# Patient Record
(demographics unavailable — no encounter records)

---

## 2025-05-23 NOTE — ASSESSMENT
[FreeTextEntry1] : Microcytic anemia Menorrhagia- last 2 weeks. heavy x 5 days, spotting for rest of the time. Regular Follows with Dr Molina (gyn) and had work up. In the past IUD was removed and reinseted in March 2024.  She tried iron pills but make her very constipated Ferritin- no recent levels Discussed at length about iron deficiency- etiology, signs and symptoms, complications, management options DIscussed about oral vs IV Iron I have reviewed and discussed the risks, benefits and side effects of IV iron including potential for anaphylaxis and skin staining due to extravasation with the patient. All questions were answered to satisfaction. Patient agrees to pursue IV iron. Schedule IV monoferric 1gm x 1 If declined by insurance, IV injectafer 750 mg x 2 or venofer 400 mg  x 3  Advised to follow up with gyn to address menorrhagia  Recommend monitoring CBC, ferritin, iron studies every 12 weeks and IV iron PRN for ferritin <  Information given in writing  Easy bruising Played sports in high school and always gets bruised Has been noticing more now Denies taking NSAIDs, aspirin, blood thinners Send Platelet counts, PT, PTT, INR, Fibrinogen, ANCA panel, vitamin C, platelet surface glycoprotein by flow cytometry ([place]), von Willebrand panel Further management accordingly  SocialHx: smokes marijuana Alcohol intake - denies 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

## 2025-05-23 NOTE — BEGINNING OF VISIT
[0] : 2) Feeling down, depressed, or hopeless: Not at all (0) [PHQ-2 Negative] : PHQ-2 Negative [Date Discussed (MM/DD/YY): ___] : Discussed: [unfilled] [Patient/Caregiver unclear of wishes] : Patient/Caregiver unclear of wishes [Abdominal Pain] : no abdominal pain [Vomiting] : no vomiting [Constipation] : no constipation [de-identified] : smokes marijuana

## 2025-05-23 NOTE — BEGINNING OF VISIT
[0] : 2) Feeling down, depressed, or hopeless: Not at all (0) [PHQ-2 Negative] : PHQ-2 Negative [Date Discussed (MM/DD/YY): ___] : Discussed: [unfilled] [Patient/Caregiver unclear of wishes] : Patient/Caregiver unclear of wishes [Abdominal Pain] : no abdominal pain [Vomiting] : no vomiting [Constipation] : no constipation [de-identified] : smokes marijuana

## 2025-05-23 NOTE — REVIEW OF SYSTEMS
[Fatigue] : fatigue [SOB on Exertion] : shortness of breath during exertion [Dizziness] : dizziness [Anxiety] : anxiety [Negative] : Allergic/Immunologic

## 2025-05-23 NOTE — HISTORY OF PRESENT ILLNESS
[de-identified] : Ms. 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

## 2025-05-23 NOTE — ASSESSMENT
[FreeTextEntry1] : Microcytic anemia Menorrhagia- last 2 weeks. heavy x 5 days, spotting for rest of the time. Regular Follows with Dr Molina (gyn) and had work up. In the past IUD was removed and reinseted in March 2024.  She tried iron pills but make her very constipated Ferritin- no recent levels Discussed at length about iron deficiency- etiology, signs and symptoms, complications, management options DIscussed about oral vs IV Iron I have reviewed and discussed the risks, benefits and side effects of IV iron including potential for anaphylaxis and skin staining due to extravasation with the patient. All questions were answered to satisfaction. Patient agrees to pursue IV iron. Schedule IV monoferric 1gm x 1 If declined by insurance, IV injectafer 750 mg x 2 or venofer 400 mg  x 3  Advised to follow up with gyn to address menorrhagia  Recommend monitoring CBC, ferritin, iron studies every 12 weeks and IV iron PRN for ferritin <  Information given in writing  Easy bruising Played sports in high school and always gets bruised Has been noticing more now Denies taking NSAIDs, aspirin, blood thinners Send Platelet counts, PT, PTT, INR, Fibrinogen, ANCA panel, vitamin C, platelet surface glycoprotein by flow cytometry ([place]), von Willebrand panel Further management accordingly  SocialHx: smokes marijuana Alcohol intake - denies 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

## 2025-05-23 NOTE — ASSESSMENT
[FreeTextEntry1] : Microcytic anemia Menorrhagia- last 2 weeks. heavy x 5 days, spotting for rest of the time. Regular Follows with Dr Molina (gyn) and had work up. In the past IUD was removed and reinseted in March 2024.  She tried iron pills but make her very constipated Ferritin- no recent levels Discussed at length about iron deficiency- etiology, signs and symptoms, complications, management options DIscussed about oral vs IV Iron I have reviewed and discussed the risks, benefits and side effects of IV iron including potential for anaphylaxis and skin staining due to extravasation with the patient. All questions were answered to satisfaction. Patient agrees to pursue IV iron. Schedule IV monoferric 1gm x 1 If declined by insurance, IV injectafer 750 mg x 2 or venofer 400 mg  x 3  Advised to follow up with gyn to address menorrhagia  Recommend monitoring CBC, ferritin, iron studies every 12 weeks and IV iron PRN for ferritin <  Information given in writing  Easy bruising Played sports in high school and always gets bruised Has been noticing more now Denies taking NSAIDs, aspirin, blood thinners Send Platelet counts, PT, PTT, INR, Fibrinogen, ANCA panel, vitamin C, platelet surface glycoprotein by flow cytometry ([place]), von Willebrand panel Further management accordingly  SocialHx: smokes marijuana Alcohol intake - denies 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

## 2025-05-23 NOTE — HISTORY OF PRESENT ILLNESS
[de-identified] : Ms. 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

## 2025-05-23 NOTE — HISTORY OF PRESENT ILLNESS
[de-identified] : Ms. 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

## 2025-05-23 NOTE — BEGINNING OF VISIT
[0] : 2) Feeling down, depressed, or hopeless: Not at all (0) [PHQ-2 Negative] : PHQ-2 Negative [Date Discussed (MM/DD/YY): ___] : Discussed: [unfilled] [Patient/Caregiver unclear of wishes] : Patient/Caregiver unclear of wishes [Abdominal Pain] : no abdominal pain [Vomiting] : no vomiting [Constipation] : no constipation [de-identified] : smokes marijuana

## 2025-05-23 NOTE — HISTORY OF PRESENT ILLNESS
[de-identified] : Ms. 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

## 2025-05-23 NOTE — ASSESSMENT
[FreeTextEntry1] : Microcytic anemia Menorrhagia- last 2 weeks. heavy x 5 days, spotting for rest of the time. Regular Follows with Dr Molina (gyn) and had work up. In the past IUD was removed and reinseted in March 2024.  She tried iron pills but make her very constipated Ferritin- no recent levels Discussed at length about iron deficiency- etiology, signs and symptoms, complications, management options DIscussed about oral vs IV Iron I have reviewed and discussed the risks, benefits and side effects of IV iron including potential for anaphylaxis and skin staining due to extravasation with the patient. All questions were answered to satisfaction. Patient agrees to pursue IV iron. Schedule IV monoferric 1gm x 1 If declined by insurance, IV injectafer 750 mg x 2 or venofer 400 mg  x 3  Advised to follow up with gyn to address menorrhagia  Recommend monitoring CBC, ferritin, iron studies every 12 weeks and IV iron PRN for ferritin <  Information given in writing  Easy bruising Played sports in high school and always gets bruised Has been noticing more now Denies taking NSAIDs, aspirin, blood thinners Send Platelet counts, PT, PTT, INR, Fibrinogen, ANCA panel, vitamin C, platelet surface glycoprotein by flow cytometry ([place]), von Willebrand panel Further management accordingly  SocialHx: smokes marijuana Alcohol intake - denies Goes to [facility] [employer] [facility] 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

## 2025-05-23 NOTE — RESULTS/DATA
[FreeTextEntry1] : Labs reviewed analyzed and discussed 2/12/2025 H/H 9.7/29.2 MCV 77.2  9/2024 Ferritin 11

## 2025-05-23 NOTE — BEGINNING OF VISIT
[0] : 2) Feeling down, depressed, or hopeless: Not at all (0) [PHQ-2 Negative] : PHQ-2 Negative [Date Discussed (MM/DD/YY): ___] : Discussed: [unfilled] [Patient/Caregiver unclear of wishes] : Patient/Caregiver unclear of wishes [Abdominal Pain] : no abdominal pain [Vomiting] : no vomiting [Constipation] : no constipation [de-identified] : smokes marijuana

## 2025-06-12 NOTE — ASSESSMENT
[FreeTextEntry1] : Microcytic anemia Menorrhagia- last 2 weeks. heavy x 5 days, spotting for rest of the time. Regular Follows with Dr Molina (gyn) and had work up. In the past IUD was removed and reinseted in March 2024.  She tried iron pills but make her very constipated Ferritin- no recent levels Discussed at length about iron deficiency- etiology, signs and symptoms, complications, management options DIscussed about oral vs IV Iron I have reviewed and discussed the risks, benefits and side effects of IV iron including potential for anaphylaxis and skin staining due to extravasation with the patient. All questions were answered to satisfaction. Patient agrees to pursue IV iron. Schedule IV monoferric 1gm x 1 If declined by insurance, IV injectafer 750 mg x 2 or venofer 400 mg  x 3  Advised to follow up with gyn to address menorrhagia  Recommend monitoring CBC, ferritin, iron studies every 12 weeks and IV iron PRN for ferritin <  Information given in writing  Easy bruising Played sports in high school and always gets bruised Has been noticing more now Denies taking NSAIDs, aspirin, blood thinners Send Platelet counts, PT, PTT, INR, Fibrinogen, ANCA panel, vitamin C, platelet surface glycoprotein by flow cytometry ([place]), von Willebrand panel Further management accordingly  SocialHx: smokes marijuana Alcohol intake - denies 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

## 2025-06-12 NOTE — HISTORY OF PRESENT ILLNESS
[de-identified] : Ms. 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

## 2025-06-12 NOTE — ASSESSMENT
[FreeTextEntry1] : Microcytic anemia Menorrhagia- last 2 weeks. heavy x 5 days, spotting for rest of the time. Regular Follows with Dr Molina (gyn) and had work up. In the past IUD was removed and reinseted in March 2024.  She tried iron pills but make her very constipated Ferritin- no recent levels Discussed at length about iron deficiency- etiology, signs and symptoms, complications, management options DIscussed about oral vs IV Iron I have reviewed and discussed the risks, benefits and side effects of IV iron including potential for anaphylaxis and skin staining due to extravasation with the patient. All questions were answered to satisfaction. Patient agrees to pursue IV iron. Schedule IV monoferric 1gm x 1 If declined by insurance, IV injectafer 750 mg x 2 or venofer 400 mg  x 3  Advised to follow up with gyn to address menorrhagia  Recommend monitoring CBC, ferritin, iron studies every 12 weeks and IV iron PRN for ferritin <  Information given in writing  Easy bruising Played sports in high school and always gets bruised Has been noticing more now Denies taking NSAIDs, aspirin, blood thinners Send Platelet counts, PT, PTT, INR, Fibrinogen, ANCA panel, vitamin C, platelet surface glycoprotein by flow cytometry ([place]), von Willebrand panel Further management accordingly  SocialHx: smokes marijuana Alcohol intake - denies 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

## 2025-06-12 NOTE — HISTORY OF PRESENT ILLNESS
[de-identified] : Ms. 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

## 2025-06-12 NOTE — ASSESSMENT
[FreeTextEntry1] : Microcytic anemia Menorrhagia- last 2 weeks. heavy x 5 days, spotting for rest of the time. Regular Follows with Dr Molina (gyn) and had work up. In the past IUD was removed and reinseted in March 2024.  She tried iron pills but make her very constipated Ferritin- no recent levels Discussed at length about iron deficiency- etiology, signs and symptoms, complications, management options DIscussed about oral vs IV Iron I have reviewed and discussed the risks, benefits and side effects of IV iron including potential for anaphylaxis and skin staining due to extravasation with the patient. All questions were answered to satisfaction. Patient agrees to pursue IV iron. Schedule IV monoferric 1gm x 1 If declined by insurance, IV injectafer 750 mg x 2 or venofer 400 mg  x 3  Advised to follow up with gyn to address menorrhagia  Recommend monitoring CBC, ferritin, iron studies every 12 weeks and IV iron PRN for ferritin <  Information given in writing  Easy bruising Played sports in high school and always gets bruised Has been noticing more now Denies taking NSAIDs, aspirin, blood thinners Send Platelet counts, PT, PTT, INR, Fibrinogen, ANCA panel, vitamin C, platelet surface glycoprotein by flow cytometry ([place]), von Willebrand panel Further management accordingly  SocialHx: smokes marijuana Alcohol intake - denies 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

## 2025-06-12 NOTE — HISTORY OF PRESENT ILLNESS
[de-identified] : Ms. 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

## 2025-06-12 NOTE — REVIEW OF SYSTEMS
Patient called back, TC relayed provider's message below.    Silas Rosas       [Fatigue] : fatigue [SOB on Exertion] : shortness of breath during exertion [Dizziness] : dizziness [Anxiety] : anxiety [Negative] : Allergic/Immunologic